# Patient Record
Sex: FEMALE | Race: BLACK OR AFRICAN AMERICAN | NOT HISPANIC OR LATINO | URBAN - METROPOLITAN AREA
[De-identification: names, ages, dates, MRNs, and addresses within clinical notes are randomized per-mention and may not be internally consistent; named-entity substitution may affect disease eponyms.]

---

## 2022-06-29 ENCOUNTER — APPOINTMENT (RX ONLY)
Dept: URBAN - METROPOLITAN AREA CLINIC 50 | Facility: CLINIC | Age: 32
Setting detail: DERMATOLOGY
End: 2022-06-29

## 2022-06-29 DIAGNOSIS — L81.4 OTHER MELANIN HYPERPIGMENTATION: ICD-10-CM

## 2022-06-29 DIAGNOSIS — L42 PITYRIASIS ROSEA: ICD-10-CM

## 2022-06-29 PROCEDURE — ? PRESCRIPTION

## 2022-06-29 PROCEDURE — 99203 OFFICE O/P NEW LOW 30 MIN: CPT

## 2022-06-29 PROCEDURE — ? PHOTO-DOCUMENTATION

## 2022-06-29 PROCEDURE — ? TREATMENT REGIMEN

## 2022-06-29 PROCEDURE — ? COUNSELING

## 2022-06-29 RX ORDER — CLOBETASOL PROPIONATE 0.5 MG/ML
1 SOLUTION TOPICAL BID
Qty: 50 | Refills: 0 | Status: ERX | COMMUNITY
Start: 2022-06-29

## 2022-06-29 RX ADMIN — CLOBETASOL PROPIONATE 1: 0.5 SOLUTION TOPICAL at 00:00

## 2022-06-29 ASSESSMENT — LOCATION SIMPLE DESCRIPTION DERM
LOCATION SIMPLE: LEFT BREAST
LOCATION SIMPLE: RIGHT BREAST
LOCATION SIMPLE: CHEST

## 2022-06-29 ASSESSMENT — LOCATION DETAILED DESCRIPTION DERM
LOCATION DETAILED: RIGHT MEDIAL SUPERIOR CHEST
LOCATION DETAILED: LEFT MEDIAL SUPERIOR CHEST
LOCATION DETAILED: RIGHT MEDIAL BREAST 1-2:00 REGION
LOCATION DETAILED: LEFT PERIAREOLAR BREAST 1-2:00 REGION

## 2022-06-29 ASSESSMENT — LOCATION ZONE DERM: LOCATION ZONE: TRUNK

## 2022-06-29 NOTE — PROCEDURE: TREATMENT REGIMEN
Samples Given: Eucerin advance repair cream
Plan: Explained to pt this will take a while to go away.\\n\\nMix with Eucerin advance repair cream and apply to affected area bid x 1 week
Detail Level: Zone
Initiate Treatment: clobetasol 0.05 % scalp solution Bid\\nSig: Mix with Eucerin advance repair cream and apply to affected area bid x 1 week

## 2022-06-29 NOTE — HPI: RASH
What Type Of Note Output Would You Prefer (Optional)?: Standard Output
Is The Patient Presenting As Previously Scheduled?: Yes
How Severe Is Your Rash?: moderate
Is This A New Presentation, Or A Follow-Up?: Rash
Additional History: Pt presents today with concerns of possible rash on bilateral breast. She was given prednisone tapering dose 2 weeks ago and is using otc hydrocortisone.